# Patient Record
Sex: MALE | Race: WHITE
[De-identification: names, ages, dates, MRNs, and addresses within clinical notes are randomized per-mention and may not be internally consistent; named-entity substitution may affect disease eponyms.]

---

## 2019-10-30 ENCOUNTER — HOSPITAL ENCOUNTER (OUTPATIENT)
Dept: HOSPITAL 59 - SUR | Age: 49
Discharge: HOME | End: 2019-10-30
Attending: PAIN MEDICINE
Payer: MEDICARE

## 2019-10-30 DIAGNOSIS — Z98.61: ICD-10-CM

## 2019-10-30 DIAGNOSIS — I10: ICD-10-CM

## 2019-10-30 DIAGNOSIS — M54.17: ICD-10-CM

## 2019-10-30 DIAGNOSIS — K21.9: ICD-10-CM

## 2019-10-30 DIAGNOSIS — Z79.01: ICD-10-CM

## 2019-10-30 DIAGNOSIS — E78.00: ICD-10-CM

## 2019-10-30 DIAGNOSIS — M54.16: Primary | ICD-10-CM

## 2019-10-30 PROCEDURE — 63650 IMPLANT NEUROELECTRODES: CPT

## 2019-10-30 PROCEDURE — 72020 X-RAY EXAM OF SPINE 1 VIEW: CPT

## 2019-10-30 PROCEDURE — 63685 INS/RPLC SPI NPG/RCVR POCKET: CPT

## 2019-10-30 PROCEDURE — 95972 ALYS CPLX SP/PN NPGT W/PRGRM: CPT

## 2019-10-30 PROCEDURE — 01936: CPT

## 2019-10-30 NOTE — HISTORY AND PHYSICAL - FERRO
CHIEF COMPLAINT/HISTORY OF CHIEF COMPLAINT:  This patient presents with a 
history of an intractable lumbar radiculopathy.  Due to the failure of therapy 
on 10/10/19 a spinal cord stimulator trial was conducted with 75-85% pain 
control.  Due to the failure of all therapy and the success of the stimulator 
trial, the patient presents for implantation of a permanent system with 
generator.  



PAST MEDICAL HISTORY:  Hypertension, asthmatic bronchitis, hypothyroidism, and 
sleep disturbance.  



PAST SURGICAL HISTORY:  None listed.  



EMPLOYMENT STATUS:  Not identified.  



MEDICATIONS ON ADMISSION:  List to be provided.  



ALLERGIES:  ABILIFY.  



FAMILY/PSYCHOSOCIAL HISTORY:  Social history - Smoking and caffeine.  Family 
history - Thyroid disease.  



SYSTEMS REVIEW:  The patient is appropriate in no acute distress.  The remainder
of the systems review is positive for glasses, dentures and reflux.  



PHYSICAL EXAMINATION:  Height is 5'6", weight is 180.  No vital signs.  

HEENT:  Within normal limits.  

LUNGS:  Clear.

HEART:  Rapid and regular.  

ABDOMEN:  Nontender.  

MUSCULOSKELETAL:  Examination of the musculoskeletal system shows diffuse 
tenderness throughout the thoracic and lumbar spine.  There is a lower extremity
component moving out towards the hips and lower extremities a pattern at L4 and 
L5.  Ambulation - No assistive device utilized.  Mild sensory and mild motor 
weakness to both legs.  

NEUROLOGIC:  Cranial nerves are intact.  



IMPRESSION:  

LUMBAR RADICULOPATHY, ICD-10 CODE M54.16 AND M54.17.  



PLAN:  The patient is here on an outpatient basis for a permanent implantation 
of spinal cord stimulator.  The potential risks, side effects and complications 
have all been discussed and reviewed.  



cc:  Dr. Jacobs and Dr. Tyler 



JOB NUMBER:  [id number]

[facility]

## 2019-10-30 NOTE — OPERATIVE NOTE - FERRO
DATE OF SURGERY:  10/30/2019



PREOPERATIVE DIAGNOSIS:  

LUMBAR RADICULOPATHY, ICD-10 CODE M54.16 AND M54.17.  



OPERATION:  

1.  FLUOROSCOPICALLY GUIDED EPIDURAL ACCESS LEFT T11-T12, PLACEMENT OF SPINAL 
CORD STIMULATOR LEAD L1, [employer] IN[employer]INION 16, 6 ELECTRODES POSITIONED 
LEFT T7.

2.  FLUOROSCOPICALLY GUIDED EPIDURAL ACCESS LEFT T12-L1, PLACEMENT OF SPINAL 
CORD STIMULATOR LEAD 2, [employer] INF[employer] 16, 6 ELECTRODES POSITIONED 
RIGHT T7.

3.  COMPLEX PROGRAMMING LEAD 1 OVER TWENTY MINUTES FOLLOWED BY COMPLEX 
PROGRAMMING LEAD 2 OVER TWENTY MINUTES.  

4.  INCISION, SUBCUTANEOUS DISSECTION ANCHORING LEAD 1 AND LEAD 2 TO 
SUPRASPINOUS FASCIA WITH A [employer] LOCKING ANCHOR AND NONABSORBABLE 
SUTURE.

5.  INCISION, SUBCUTANEOUS DISSECTION AND CREATION OF SUBCUTANEOUS POUCH LEFT 
FLANK AT A SITE PICKED BY THE PATIENT FOR GENERATOR, [employer] 
PROGRAMMABLE RECHARGEABLE WAVEWRITER.  

6.  TUNNELLING BETWEEN MIDLINE POUCHES, PLACEMENT OF EXTERNAL PORTION OF LEAD 1 
AND LEAD 2 INTO GENERATOR POUCH EACH LEAD INTERFACING THE GENERATOR.

7.  ANTIBIOTIC IRRIGATION AND BOVIE FOR HEMOSTASIS, PLACEMENT OF GENERATOR 
POUCH, PLACEMENT OF LEADS INTO POUCH, CLOSURE OF BOTH INCISION, STRATAFIX SUTURE
2-0 FASCIAL AND 3-0 SKIN.  DERMABOND CLOSURE.  

8.  COMPLEX RECOVERY ROOM PROGRAMMING INTERNAL GENERATOR HOME USE TWO 
STIMULATORS RECOVERY ROOM TWENTY MINUTES.  



SURGEON:  Anastacio Mitchell D.O.



ANESTHESIA:  Local sedation.  



ANESTHESIA PROVIDER:  Lauren Phoenix, CRNA



INDICATION:  This patient presents with a history of intractable lumbar 
radiculopathy.  Due to the failure of all therapies and the success of the 
trial, he presents for implantation of permanent system.  



PROCEDURE:  Intravenous line, vital sign monitoring, IV sedation.  Prepped and 
draped, sterile technique.  Under imaging the epidural interspace left of the 
midline at T11-T12 and T12-L1 are both identified and marked, skin infiltrated, 
two separate standard epidural needles with loss of resistance was used to gain 
entry into the space at T11-T12 and T12-L1.  Atraumatic.  No blood.  No CSF.  At
T11-T12 the spinal cord stimulator Lead 1, a [employer] Infinion 16, 6 
electrodes was positioned left of T7.  At the T12-L1, the spinal cord stimulator
Lead 2, a [employer] Infinion 16, 6 electrodes was positioned right of 
T7.  Complex programming of Lead 1 over twenty minutes followed by complex 
programming of Lead 2 over twenty minutes resulting in a complete pattern of 
stimulation across the back and into the legs.  The patient is indicating that 
we hit all if the areas of the pain.  The skin below and above both needles was 
then infiltrated with local.  An incision was made and subcutaneous dissection 
was conducted to the supraspinous fascia.  The needle was removed and each lead 
was anchored to the supraspinous fascia with a [employer] locking anchor 
nonabsorbable suture.  At the left flank which was the site picked by the 
patient for the generator, [employer] programmable rechargeable 
Wavewriter.  The skin was infiltrated, an incision was made and subcutaneous 
dissection was conducted to form a pouch of suitable size and depth for the 
generator.  A tunnelling tool was used to carry the leads into the generator 
pouch and then each lead was interfaced to the generator.  Antibiotic irrigation
and Bovie for hemostasis.  The generator was placed into the pouch, the leads 
were placed into their own pouch and then both incisions were closed using 
Stratafix suture 2-0 for fascia and 3-0 for skin.  Dermabond closure was then 
used to approximate the edges of both wounds.  He was transferred to the 
Recovery Room in stable condition.  There are no side effects from the procedure
or sedation.   When fully awake and alert, complex programming of the internal 
generator over twenty minutes was performed re-establishing stimulation pain 
control of all of the appropriate areas.  He was requesting discharge home.  He 
was monitored in the Recovery Room until stable, once the programming can be 
performed and the patient and wife were shown on how to use the system he is 
prepared for discharge.  



DISCHARGE INSTRUCTIONS:

1.  The sites will remain clean and dry.  No showering or bathing in any way 
that would disruption dressings, if it happens contact the clinic.  

2.  Standard medications resumed including the antibiotic Levaquin 500 mg once a
day for fourteen days.  

3.  He has been provided with a seven day prescription of Norco for incisional 
pain.  He will be seen in the office within this period of time.  He is to keep 
his activities low.  Limit bend, lift, push, and pull.  Office to contact in 12-
24 hours to set up an appointment in 7-10 days at which point we will evaluate 
his sites and determine activities.  





JOB NUMBER:  [id number]

[facility]

## 2019-10-31 NOTE — RADIOLOGY REPORT
EXAMINATION:  AP Spine Single View

EXAM DATE:  10/30/2019 9:21 AM



TECHNIQUE:  AP view of the thoracic spine 



INDICATION:  S/P SCS IMPLANT

COMPARISON:  None 



ENCOUNTER: Initial

_________________________



FINDINGS: 



Left epidural stimulator device noted with leads extending midline to the T8 level. Nonspecific bowel
 gas pattern. Visualized lung fields are clear.

_________________________



IMPRESSION:



Epidural stimulator implant as above.







Dictated by: Jac Harding DO on 10/31/2019 2:39 PM.

Electronically signed by: Jac Harding DO on 10/31/2019 2:41 PM.